# Patient Record
Sex: FEMALE | Race: WHITE
[De-identification: names, ages, dates, MRNs, and addresses within clinical notes are randomized per-mention and may not be internally consistent; named-entity substitution may affect disease eponyms.]

---

## 2019-09-01 NOTE — CT
PRELIMINARY REPORT/[facility]/EMERGENCY AFTER

HOURS PROCEDURE: 

 

EXAM:

CT Lumbar Spine Without Contrast

 

EXAM DATE/TIME:

9/1/2019 1:45 AM

 

CLINICAL HISTORY:

59 years old, female; Low back pain; Patient HX: 60 y/o F presents to ED via EMS transport C/O sudden
 onset lower back pain. PT states that pain began suddenly while she was walking home from [address].
 Denies known trauma, recent fall. Per EMS, vss en route.

 

TECHNIQUE:

Imaging protocol: Computed tomography images of the lumbar spine without contrast.

 

COMPARISON:

No relevant prior studies available.

 

FINDINGS:

Vertebrae: No fracture.

Discs/Spinal canal/Neural foramina: No spinal stenosis. No neural foraminal narrowing.

Kidneys and ureters: Nonobstructive nephrolithiasis left kidney.

Soft tissues: Unremarkable.

 

IMPRESSION:

No fracture.

 

Thank you for allowing us to participate in the care of your patient.

Dictated and Authenticated by: Bean Dee MD

09/01/2019 1:56 AM Central Time ([place] & [place])

 

 

FINAL REPORT 

 

EMERGENCY AFTER HOURS LUMBAR SPINE CT SCAN WITHOUT IV CONTRAST:

 

Date:  09/01/19

Time:  0146 hours

 

HISTORY:  

Low back pain. 

 

FINDINGS/IMPRESSION: 

Bilateral nonobstructing renal calculi without evidence for acute  obstruction. Generalized lumbar 
spondylosis. No fracture or dislocation. 

 

Report in agreement with preliminary report given on-call by Hans. 

 

 

POS: [facility]

## 2019-09-01 NOTE — CT
PRELIMINARY REPORT/[facility]/EMERGENCY AFTER

HOURS PROCEDURE: 

 

EXAM:

CT Head Without Contrast

 

EXAM DATE/TIME:

9/1/2019 1:43 AM

 

CLINICAL HISTORY:

59 years old, female; Altered mental status/memory loss; Patient HX: 60 y/o F presents to ED via EMS 
transport C/O sudden onset lower back pain. PT states that pain began suddenly while she was walking 
home from [facility]. Denies known trauma, recent fall. Per EMS, vss en route.

 

TECHNIQUE:

Imaging protocol: Computed tomography of the head without contrast.

 

COMPARISON:

No relevant prior studies available.

 

FINDINGS:

Brain: Volume loss and chronic small vessel ischemic change. Old infarction in the left basal ganglia
.

No brain edema. No intracranial hemorrhage.

Ventricles: Normal. No ventriculomegaly.

Bones/joints: Unremarkable. No acute fracture.

Sinuses: Visualized sinuses are unremarkable. No fluid levels.

Mastoid air cells: Visualized mastoid air cells are well aerated.

Soft tissues: Unremarkable.

 

IMPRESSION:

No acute brain findings.

 

 

 

Thank you for allowing us to participate in the care of your patient.

Dictated and Authenticated by: Bean Dee MD

09/01/2019 1:52 AM Central Time ([place] & [place])

 

 

 

FINAL REPORT 

 

EMERGENCY AFTER HOURS BRAIN CT WITHOUT IV CONTRAST:

 

Date:  09/01/19 

Time:  0144 hours

 

COMPARISON:  

07/17/14. 

 

FINDINGS/IMPRESSION:

Old left basal ganglia infarct changes. No mass or bleed. 

 

Report in agreement with preliminary report given on-call by Hans. 

 

 

POS: [facility]

## 2019-10-09 NOTE — RAD
PA AND LATERAL CHEST:

 

Date:  10/09/19 

 

HISTORY:  

Cough. 

 

FINDINGS:

The heart size appears slightly enlarged. Mediastinal structures are unremarkable. Lungs are clear of
 infiltrates. There are arthritic changes of the spine. 

 

IMPRESSION: 

Minimal cardiomegaly. No acute findings.  

 

 

 

POS: [facility]

## 2019-11-17 NOTE — CT
CT CERVICAL SPINE WITHOUT CONTRAST:

 

Date:  11/17/19 

 

HISTORY:  

Fall. Trauma. Pain. 

 

FINDINGS:

No craniocervical dissociation. Appropriate alignment of the lateral masses of C1 and C2, as well as 
the facets. Intact odontoid process. 

 

Cervical spine vertebral body height is maintained. There is no fracture. 

 

Soft tissue neck structures are unremarkable. Mass effect upon the posterior left supraglottic larynx
 due to medial deviation of the carotid artery. Upper mediastinum and lung apices are unremarkable. 

 

Varying degrees of central canal stenosis and foraminal narrowing on the basis of degenerative change
. 

 

Cervical spine vertebral body height is maintained. There is no fracture. 

 

IMPRESSION: 

No fracture. 

 

 

POS: [facility]

## 2019-11-17 NOTE — RAD
XR Hand Lt 3 View STANDARD



HISTORY: Injury, left hand pain



FINDINGS:

No fracture or dislocation is identified.



Reported By: Ken Escudero 

Electronically Signed:  11/17/2019 7:18 PM

## 2019-11-17 NOTE — CT
HEAD CT WITHOUT CONTRAST:

 

Date:  11/17/19 

 

COMPARISON:  

09/01/19. 

 

HISTORY:  

Pain. Fall. 

 

FINDINGS:

Left frontal and left periorbital swelling and hematoma. Bilateral ocular lenses are appropriately lo
cated. Both globes are intact. Retrobulbar fat is preserved. Symmetric attenuation of the optic nerve
s and ocular rectus muscles. 

 

Adequate aeration of the sinuses and mastoid air cells. No evidence of an orbital wall or a calvarial
 fracture. 

 

No parenchymal hemorrhage or extra-axial hematoma. No midline shift. 

Basilar cisterns are patent. Age-appropriate atrophy. 

 

Remote insult involving the left external capsule and left corona radiata. Additional white matter hy
podensities due to chronic small vessel ischemic change. Remote white matter insult involving the ant
erior right corona radiata. 

 

IMPRESSION: 

1.  Left periorbital and scalp hematoma. 

2.  No intracranial post-traumatic sequelae. 

3.  Chronic changes as described above. 

 

 

 

POS: [facility]

## 2019-11-17 NOTE — RAD
XR Knee Lt 4 View STANDARD



HISTORY: Injury, left knee pain



FINDINGS:

No fracture or dislocation is identified. Degenerative changes are present.



Reported By: Ken Escudero 

Electronically Signed:  11/17/2019 7:17 PM

## 2020-01-09 NOTE — MRI
Exam: Brain MRI without contrast



HISTORY: Aphasia. Left-sided weakness. Evaluate for CVA.



COMPARISON: 7/18/2014



FINDINGS: 

Calvarial marrow signal intensity: Appropriate T1 signal

Gradient echo sequence: Hemosiderin deposition due to remote insult in the right thalamus in the left
 lentiform nucleus

Brain parenchyma: Stable changes from remote insult. No parenchymal mass, mass effect or midline shif
t. Age-appropriate atrophy.

Cortical gray-white matter differentiation: Preserved

Restricted diffusion: Central arterial flow voids are maintained. Absent restricted diffusion

White matter signal intensities: T2, FLAIR white matter hyperintensities due to chronic small vessel 
ischemic changes



Sinuses: Adequate aeration of the paranasal sinuses and mastoid air cells. 





IMPRESSION:



1. Absent restricted diffusion. No acute infarct

2. Remote insult involving the right thalamus and left lentiform exist.

3. Chronic small vessel ischemic changes of the white matter



Reported By: Jenny Jackson 

Electronically Signed:  1/9/2020 4:47 PM

## 2020-01-09 NOTE — PDOC.HHP
Hospitalist HPI





- History of Present Illness


confusion


History of Present Illness: 


 The patient is a 59 year old female who presented with confusion around 10:00 

am. Per significant other, they were on the way to visit some friends. As they 

were leaving with their bags packed, the significant other noticed that the 

patient was unsteady on her feet and was confused. When they met their friends 

the patient couldn't recognize two of her friends and was unable to state where 

she was. Her speech seemed normal however.  He was concerned about a stroke and 

brought her to the emergency room. The patient complains that her left hand is 

numb and her left leg was numb as well. She reports these symptoms started 

today. She denied dizziness, lightheadedness, chest pain, shortness of breath. 

According to significant other, she has had some episodes of confusion in the 

past, but has no history of dementia. 





ED Course: 








Per ER attending, patient had left sided weakness and slurred speech that 

resolved after 30 minutes. EKG showed no acute disease.  CT brain showed no 

acute disease. The patient was admitted for further workup. When she came to 

the floor, nurse noticed that her NIH was 5 and patient was unable to swallow 

water without coughing. Additional neuro workup ordered. 











Hospitalist ROS





- Review of Systems


Constitutional: denies: fever, chills


Eyes: denies: pain, vision change


ENT: denies: ear pain, ear discharge


Respiratory: denies: cough, shortness of breath, pleuritic pain


Cardiovascular: denies: chest pain, palpitations, orthopnea


Gastrointestinal: denies: nausea, vomiting, abdominal pain, diarrhea, 

constipation


Genitourinary: denies: dysuria, frequency, incontinence


Musculoskeletal: denies: neck pain, shoulder pain





Hospitalist History





- Past Medical History


Cardiac: reports: HTN


Endocrine: reports: Diabetes





- Past Surgical History


Other Surgical History: 





Patient does not remember








- Family History


Other Family History: 





Brothers had strokes








- Social History


Smoking Status: Never smoker


Alcohol: reports: None


Drugs: reports: none


Occupation: Used to work as home health aide. Lives with boyfriend, homelesss 

currently





- Exam


General Appearance: NAD, awake alert


General - other findings: has some issues with word finding, slow speech 


Eye: PERRL, anicteric sclera


ENT: normocephalic atraumatic, no oropharyngeal lesions


Neck: supple, symmetric, no JVD, no thyromegaly


Heart: RRR, no murmur, no gallops, no rubs


Respiratory: CTAB, no wheezes, no rales, no ronchi


Gastrointestinal: soft, non-tender, non-distended, normal bowel sounds


Extremities: no cyanosis, no clubbing, no edema


Skin: normal turgor, no lesions, no rashes


Neurological: cranial nerve grossly intact, no focal deficits


Neurological - other findings: 4/5 strength in LUE and LLE.  Reflex hyperactive 

right leg, downgoing toe L


Musculoskeletal: normal tone, normal strength, no muscle wasting


Psychiatric: normal affect, normal behavior


Psychiatric - other findings: Patient doesn't know president, states date is 

February 2002





Hospitalist Results





- Labs


Result Diagrams: 


 01/09/20 11:01





 01/09/20 11:01


Lab results: 


 











WBC  9.4 thou/uL (4.8-10.8)   01/09/20  11:01    


 


Hgb  14.4 g/dL (12.0-16.0)   01/09/20  11:01    


 


Hct  43.2 % (36.0-47.0)   01/09/20  11:01    


 


MCV  87.1 fL (78.0-98.0)   01/09/20  11:01    


 


Plt Count  312 thou/uL (130-400)   01/09/20  11:01    


 


Neutrophils %  70.3 % (42.0-75.0)   01/09/20  11:01    


 


Sodium  139 mmol/L (136-145)   01/09/20  11:01    


 


Potassium  3.8 mmol/L (3.5-5.1)   01/09/20  11:01    


 


Chloride  101 mmol/L ()   01/09/20  11:01    


 


Carbon Dioxide  28 mmol/L (22-29)   01/09/20  11:01    


 


BUN  18 mg/dL (9.8-20.1)   01/09/20  11:01    


 


Creatinine  0.87 mg/dL (0.6-1.1)   01/09/20  11:01    


 


Glucose  129 mg/dL ()  H  01/09/20  11:01    


 


Calcium  9.6 mg/dL (7.8-10.44)   01/09/20  11:01    


 


Total Bilirubin  0.5 mg/dL (0.2-1.2)   01/09/20  11:01    


 


AST  16 U/L (5-34)   01/09/20  11:01    


 


ALT  11 U/L (8-55)   01/09/20  11:01    


 


Alkaline Phosphatase  108 U/L ()   01/09/20  11:01    


 


Creatine Kinase  62 U/L ()   01/09/20  11:01    


 


Troponin I  Less than  0.010 ng/mL (< 0.028)   01/09/20  11:01    


 


Serum Total Protein  7.4 g/dL (6.0-8.3)   01/09/20  11:01    


 


Albumin  4.2 g/dL (3.5-5.0)   01/09/20  11:01    


 


Urine Ketones  Negative mg/dL (Negative)   01/09/20  13:00    


 


Urine Blood  Negative  (Negative)   01/09/20  13:00    


 


Urine Nitrite  Negative  (Negative)   01/09/20  13:00    


 


Ur Leukocyte Esterase  250 Pia/uL (Negative)  A  01/09/20  13:00    


 


Urine RBC  0-3 HPF (0-3)   01/09/20  13:00    


 


Urine WBC  4-6 HPF (0-3)  A  01/09/20  13:00    


 


Ur Squamous Epith Cells  4-6 HPF (0-3)  A  01/09/20  13:00    


 


Urine Bacteria  None Seen HPF (None Seen)   01/09/20  13:00    














- EKG Interpretation


EKG: 





no acute disease 





Hospitalist H&P A/P





- Plan


Plan: 


MRI brain: no acute infarct. Remote insult involving right thalamus and left 

lentiform exist.  Chronic small vessel change of white matter


CTA: no significant stenosis


CT head: no acute disease





This  is a 59 year old female wtih hypertension, diabetes who presented with 

left sided numbness, confusion, dysarthria











#Acute encephalopathy


#Left sided weakness


- MRI brain showing no acute infarct, remote insult on right thalamus?  CTA 

shows no significant stenosis. CT head showed no acute disease 


- continue aspirin 81 mg daily, atorvastatin 40 mg daily, 


- neurology consult


- ECHO


-PT/OT/speech consult 








Hypertension


- . Will keep BP on the higher side for now








TYpe II diabetes


- insulin sliding scale








GERD


- protonix








Depression 


- continue lexapro 














Code status: full code

## 2020-01-09 NOTE — CT
INDICATION: Aphasia



COMPARISON: None



TECHNIQUE: CT angiogram of the head and neck are performed in the axial plane. Three-dimensional refo
rmatted images are submitted for interpretation.





FINDINGS:



CTA OF THE HEAD WITH AND WITHOUT CONTRAST:



POSTCONTRAST CT OF BRAIN:

Pathologic enhancement: No pathologic enhancement the brain.



Postcontrast soft tissue neck CT:

Sinuses: Adequate aeration.

Orbits: Bilateral ocular lenses are appropriately located. Both globes are intact. Retrobulbar fat is
 preserved. Symmetric attenuation the optic nerves and ocular rectus muscles.

Salivary glands:Symmetric attenuation of the subcutaneous mandibular glands. Symmetric fatty replacem
ent of bilateral parotid glands 

Thyroid gland: Left thyroid lobe appears to be surgically absent. Correlate clinically.

Lymph nodes: Nonspecific enlarged right level 2 lymph node measuring 1.0 x 1.2 cm.

Paraspinal muscles: Symmetric attenuation of the sternocleidomastoid muscles. Appropriate attenuation
 of the paraspinal muscles.

Cervical spine:Vertebral body height is maintained. No fracture. No significant central canal stenosi
s or significant neural foraminal narrowing. Limited evaluation by technique. 

Upper mediastinum and lung apices: No acute abnormality.



CTA OF THE NECK WITH CONTRAST:

Aorta: Appropriate enhancement and luminal diameter

Right carotid artery: The right carotid artery origin, common carotid artery, carotid bifurcation and
 internal carotid artery have appropriate enhancement and luminal diameter.

Left carotid: The left carotid artery origin, common carotid artery, carotid bifurcation and internal
 carotid artery have  appropriate  enhancement and luminal diameter.

Subclavian arteries:Patent and symmetric 

Vertebral arteries:Patent cervical vertebral arteries. Dominant left vertebral  artery. 



CTA OF THE BRAIN:

Intracranial internal carotid arteries:Appropriate enhancement and luminal diameter 

Anterior circulation: Appropriate and symmetric enhancement of the A1 and M1 segments. Proximal A2 se
gments and proximal MCA branches have symmetric enhancement.

Intracranial vertebral arteries: Appropriate enhancement and luminal diameter.

Posterior circulation: Bilateral PICA artery origins have appropriate enhancement and luminal diamete
r. Both vertebral arteries supply a normal caliber basilar artery. Bilateral P1 segments have

appropriate enhancement and luminal diameter.



IMPRESSION:

1. No hemodynamically significant stenosis, occlusion or aneurysmal formation.



2. Nonspecific enlarged right level 2 lymph node. Correlate clinically.



Transcribed Date/Time: 1/9/2020 5:57 PM



Reported By: Jenny Jackson 

Electronically Signed:  1/11/2020 9:00 AM

## 2020-01-09 NOTE — CT
HEAD CT WITHOUT CONTRAST:





HISTORY: 

Level 1 stroke. History of mini strokes. Left-sided weakness. Facial droop.



COMPARISON:

11/17/2019.





FINDINGS:

Hemorrhage: No intraparenchymal hemorrhage or extra-axial hematoma.

Brain parenchyma: Stable encephalomalacia involving the left external capsule and anterior corona rad
iata. There is stable ex vacuo dilatation of the frontal horn of the left lateral ventricle.

Cortical gray-white matter differentiation is preserved. No midline shift. Basilar cisterns are paten
t.Chronic small vessel ischemic changes of the white matter are noted and are unchanged.

Ventricular system: Stable configuration of the ventricular system.

Calvarium: Intact.

Sinuses and mastoid air cells: Adequate aeration.



IMPRESSION:

No acute intracranial process.



Results of the study discussed with Dr. Gordillo 1/9/2020 at 11:10 AM.



Code CR





Transcribed Date/Time: 1/9/2020 11:13 AM



Reported By: Jenny Jackson 

Electronically Signed:  1/9/2020 11:19 AM

## 2020-01-10 NOTE — DIS
DATE OF ADMISSION:  01/09/2020



DATE OF DISCHARGE:  01/10/2020



DISCHARGE DIAGNOSIS:  Acute encephalopathy and left-sided weakness, possibly

secondary to transient ischemic attack versus panic attack. 



CONSULTATIONS:  Neurology with Dr. Luis Alberto Hawley.



PROCEDURES:  None.



BRIEF HISTORY OF PRESENT ILLNESS:  This is a 59-year-old female with past 
medical

history of unspecified mental illness, depression, diabetes, hypertension, who

presented to the emergency room with confusion around 10:00 am.  The patient's

 states they were evicted from their apartment and as they were leaving 
with

their bags packed to their friend's house, the patient was very unsteady on her 
feet

and was confused.  She was unable to recognize two of her friends and was having

difficulty with word finding.  The patient also had complained that her left 
hand

and her left leg were numb.  The patient was brought to the emergency room for

further evaluation.  Upon presentation to the ER, the patient had a blood 
pressure

of 119/69.  The rest of her vitals were unremarkable.  CT head showed no acute

disease.  EKG showed no acute disease.  The patient was noted to have left-sided

weakness and slurred speech per the ER attending on exam, which had resolved 
after

30 minutes.  The patient was admitted for possible TIA. 



HOSPITAL COURSE: 

 Acute encephalopathy and left-sided weakness, possibly secondary

to TIA versus panic attack:  On upon arrival to the floor, the patient had an 
NIH

stroke scale of 5.  She was noted to have weakness on the left arm and the left 
leg

that was 4/5 compared to the right side.  She underwent a CTA of her head and 
neck,

which showed no significant stenosis.  MRI of her brain showed a remote insult 
in

her right thalamus and left lentiform.  There was no acute infarction.  The 
patient

was continued on her aspirin and her statin.  Neurology was consulted, who felt 
that

this was more likely anxiety given her current social situation.  The following 
day,

the patient had no neurological deficits.  She was seen by Physical Therapy who 
did

not feel that she needed rehab.  She was also seen by Speech and was placed on

regular diet.  The patient also had an echocardiogram done which showed no 
thrombus.  She is advised to continue all her home medications as well as 
aspirin

and follow up with her PCP in a week. 



Hypertension:  Initially, the patient's blood pressure medications were held.  
On

the day of discharge, her blood pressure increased to 165/105.  She was resumed 
on

her amlodipine, hydrochlorothiazide, and lisinopril. 



Type 2 diabetes:  The patient takes metformin and glyburide at home. 



Depression:  The patient states that she ran out of her Lexapro a few days 
prior to

admission.  She was given a new prescription for this.  She was also continued 
on

her Abilify, which she takes for unspecified mental illness. 



DISCHARGE PHYSICAL EXAMINATION:  

VITAL SIGNS:  Temperature 98, heart rate 95,

respiratory rate 12, O2 saturation 97% on room air, blood pressure 165 systolic 
over

105.  The patient was given her home antihypertensive. 

GENERAL:  The patient is alert, awake, oriented x3.  She has some trouble with 
word

finding and answering questions, but this is noted to be intermittent and 
chronic

per family members. 

NEURO:  Cranial nerves 2 through 12 were intact.  She has intact sensation in 
all 4

extremities.  She has full range of motion in all 4 extremities and 5/5 
strength in

all 4 extremities.  Reflexes are 2+ throughout.  She has negative Babinski 
sign. 

CVS:  Regular rate and rhythm with no murmurs, rubs, or gallops. 

LUNGS:  Clear to auscultation bilaterally. 

ABDOMEN:  Positive bowel sounds, soft, nontender, nondistended. 

EXTREMITIES:  No edema.



PERTINENT LABORATORY DATA:  

CBC on 01/10: was unremarkable. 

BMP on 01/10: is unremarkable except for elevated glucose. 

LFTs; AST 16, ALT 11, alkaline phosphatase 108. 

Troponin I: less than 0.010 x3. 

UA: shows 4-6 white blood cells, 250 leukocyte esterase.



PERTINENT IMAGING DATA: 

 CT brain on 01/09:shows no acute disease. 

MRI brain on 01/09: shows absent restricted diffusion.  No acute infarct.  
Remote

insult involving the right thalamus and left lentiform.  Chronic small-vessel

ischemic changes of the white matter. 

CTA head and neck on 01/09:shows no hemodynamically significant stenosis or

aneurysmal formation. 

ECHO :  EF 55-60%, grade 1/3 diastolic dysfunction. Normal RV size and 
function. 



DISCHARGE CONDITION:  Stable.



ACTIVITY:  As tolerated.



DIET:  Heart healthy, diabetic diet.



DISCHARGE MEDICATIONS: 

 New medications:  Aspirin 81 mg p.o. daily. 

Old medications:

1. Albuterol inhaler 2 puffs inhalation q.6 hours.

2. Amlodipine 10 mg p.o. daily.

3. Gabapentin 300 mg p.o. daily.

4. Hydrochlorothiazide 12.5 mg p.o. daily.

5. Lisinopril 40 mg p.o. daily.

6. Metformin 500 mg p.o. b.i.d.

7. Metoprolol 25 mg p.o. daily.

8. Oxybutynin 5 mg p.o. b.i.d.

9. Lexapro 20 mg p.o. daily.

10. Hydroxyzine 10 mg p.o. q.8 hours.



DISCHARGE INSTRUCTIONS:  The patient to follow up with her PCP in a week and

consider following up with mental health. 







Job ID:  [id number]



[facility]

## 2020-01-10 NOTE — CON
DATE OF CONSULTATION:  01/09/2020



CONSULTING PHYSICIAN:  Hospitalist Service.



IMPRESSION:  

1. Panic attack.

2. Hyperlipidemia.

3. MRI suggests small vessel ischemic disease.



PLAN:  

1. Start aspirin 81 mg daily.

2. Continue statin.

3. Consider social service consult to see, if assistance can be had to assist in her

living situation. 



HISTORY OF PRESENT ILLNESS:  Ms. Curran is a 59-year-old woman with past history of

hyperlipidemia, asthma, who has been living with her boyfriend in one of the local

motels.  They report that they were essentially out of money and decided they need

to get on the bus and leave at that point.  They actually had no particular

destination.  Her boyfriend noted that she seemed a bit flustered and seemed to have

all difficulty recalling what the game plan would be.  While on the bus, she seemed

to become unable to speak.  They did not note any facial asymmetry or weakness in

the extremities.  He decided that she needed to be checked out medically.  She

continued to act in this fashion, being speechless for about an hour.  Since then,

she continues to appear confused and was reporting having difficulty with

orientation when I assessed earlier.  She denies a history of stroke-like symptoms. 



Since admission, she had a CT of the brain, which was normal.  Her lab work was all

in normal range.  Her CT angiogram did not show any evidence for stenosis.  Her MRI

of the brain showed some old lacunar areas of infarction, but no acute

abnormalities. 



PAST MEDICAL HISTORY:  As listed above.



ALLERGIES:  CODEINE.



SOCIAL HISTORY:  No tobacco or drug use.



FAMILY HISTORY:  Noncontributory.



REVIEW OF SYSTEMS:  Ten-system review of systems is otherwise negative.



PHYSICAL EXAMINATION:

GENERAL:  She is a somewhat overweight middle-aged woman, in no acute distress. 

VITAL SIGNS:  Stable.  She has been afebrile. HEENT:  Pupils equal and reactive.

Conjunctivae clear.  Oropharynx clear. 

NECK:  Supple. 

EXTREMITIES:  No cyanosis or edema. 

NEUROLOGIC:  She was awake and cooperative.  She appears a bit anxious and jittery.

Her speech was fluent and clear.  She was slow to answer questions about her

orientation, but did so correctly for me, but had recently been prompted by the

speech therapist.  There was no cranial nerve deficits.  Motor exam showed good

strength bilaterally.  Sensations intact.  No abnormal movements were seen.  Gait

was not tested. 



SUMMARY:  A workup thus far has been negative.  The overall story suggests a panic

attack.  I do not think that there is going to be any acute neurologic issue to deal

with. 







Job ID:  [id number]

## 2020-01-18 NOTE — EKG
Test Reason : STROKE

Blood Pressure : ***/*** mmHG

Vent. Rate : 062 BPM     Atrial Rate : 062 BPM

   P-R Int : 218 ms          QRS Dur : 068 ms

    QT Int : 374 ms       P-R-T Axes : 043 -28 069 degrees

   QTc Int : 379 ms

 

Sinus rhythm with 1st degree A-V block

Voltage criteria for left ventricular hypertrophy

Inferior infarct , age undetermined

Abnormal ECG

 

Confirmed by JACOB NIXON, MASTER WYNN (9),  NATASHA ALVARENGA ([id number]) on 1/18/2020 10:48:03 AM

 

Referred By:             Confirmed By:MASTER JAMES MD

## 2020-03-04 NOTE — PDOC.FPRHP
- History of Present Illness


Chief Complaint: chest pain


History of Present Illness: 


This is a 59yo F presenting to the ER today for a CC of chest pain that started 

last night. She describes the pain as pressure-like and radiates to her left 

arm and down. She reports the pain was 9/10 at its worst. She states that it 

woke her up throughout the night. The pain was intermittent but became constant 

this AM. She has never had a pain like this before. SHe denies any associated 

symptoms such as SOB, NVD, abd pain, palpitations, vision changes. SHe is 

normally very active and walks around her neighborhood with her  almost 

daily. She states the only thing that has relieved her pain has been the nitro. 





She does have a PMH of stroke in 2017 - she does not have residual weakness. 

She is on clopidogrel and ASA. Patient also has a hx of DM and HTN. She has not 

been compliant with taking her medications. 


ED Course: 


nitro





- Allergies/Adverse Reactions


 Allergies











Allergy/AdvReac Type Severity Reaction Status Date / Time


 


codeine Allergy  Nausea Verified 03/04/20 17:21














- Home Medications


 











 Medication  Instructions  Recorded  Confirmed  Type


 


Amlodipine Besylate [amLODIPine 10 mg PO DAILY 07/18/14 03/04/20 History





Besylate]    


 


Gabapentin 300 mg PO BID 07/18/14 03/04/20 History


 


Lisinopril 40 mg PO DAILY 07/18/14 03/04/20 History


 


Metoprolol Tartrate 25 mg PO DAILY 07/18/14 03/04/20 History


 


Oxybutynin Chloride 5 mg PO BID 07/18/14 03/04/20 History


 


hydrOXYzine HCl 10 mg PO Q8HR PRN 07/18/14 03/04/20 History


 


metFORMIN [Glucophage] 500 mg PO BID 07/18/14 03/04/20 History


 


Hydrochlorothiazide 12.5 mg PO DAILY 01/09/20 03/04/20 History


 


Aspirin [Ecotrin Low Strength] 81 mg PO DAILY #30 tab 01/10/20 03/04/20 Rx


 


Atorvastatin Calcium [Lipitor] 40 mg PO DAILY #30 tab 01/10/20 03/04/20 Rx


 


Escitalopram Oxalate [Lexapro] 20 mg PO DAILY #30 tab 01/10/20 03/04/20 Rx


 


Clopidogrel Bisulfate [Clopidogrel] 75 g PO DAILY 03/04/20 03/04/20 History


 


FLUoxetine HCl [Prozac] 40 mg PO DAILY 03/04/20 03/04/20 History


 


traZODone HCl [Trazodone HCl] 25 mg PO HS 03/04/20 03/04/20 History














- History


PMHx: DM, HTN, HLD, hx of stroke, anxiety/depression, biplar


 


PSHx: partial hysterectomy, cholecystectomy, tonsillectomy, appendectomy





FHx: Father - [place] age 64


 


Social: Denies alcohol, tobacco or drug use


 








- Review of Systems


General: denies: fever/chills, weight/appetite/sleep changes, night sweats, 

fatigue


Eyes: denies: vision changes


ENT: denies: nasal congestion, rhinorrhea


Respiratory: denies: cough, congestion, shortness of breath, exercise 

intolerance


Cardiovascular: reports: chest pain.  denies: palpitation, edema, paroxysmal 

nocturnal dyspnea, orthopnea


Gastrointestinal: denies: nausea, vomiting, diarrhea, constipation, abdominal 

pain


Genitourinary: denies: dysuria


Skin: denies: rashes, lesions


Musculoskeletal: denies: pain, tenderness


Neurological: denies: weakness





- Vital signs


BP: 113/56, Pulse: 61, Temp: 98.1 (Oral), Pain: 6, O2 sat: 95 on (Room Air), 

Time: 3/4/2020 14:08.


BP: 120/62, MAP: 81, Pulse: 57, Resp: 14, Temp: 98.2 (Oral), Pain: 4, O2 sat: 

96 on (Room Air), Time: 3/4/2020 15:31. Weight 82kg 








- Physical Exam


Constitutional: NAD, awake, alert and oriented, well developed


HEENT: normocephalic and atraumatic, PERRLA, EOMI, grossly normal vision, 

grossly normal hearing, MMM


Neck: supple, FROM, trachea midline


Chest: no lesions


-Chest: 


TTP across ant chest


Heart: RRR, normal S1/S2, no murmurs/rubs/gallops, pulses present, no edema


Lungs: CTAB, no respiratory distress, good air movement, no rales/rhonchi, no 

wheezing


Abdomen: soft, non-tender, bowel sounds present, no masses/distention


Musculoskeletal: normal structure


Neurological: no focal deficit


Skin: no rash/lesions, good turgor, capillary refill <2 seconds


Heme/Lymphatic: no unusual bruising or bleeding


Psychiatric: normal mood and affect, good judgment and insight, intact recent 

and remote memory





FMR H&P: Results





- Labs


Result Diagrams: 


 03/05/20 04:38





 03/05/20 04:38


Lab results: 


 











WBC  8.7 thou/uL (4.8-10.8)   03/04/20  14:28    


 


Hgb  12.3 g/dL (12.0-16.0)   03/04/20  14:28    


 


Hct  35.9 % (36.0-47.0)  L  03/04/20  14:28    


 


MCV  89.8 fL (78.0-98.0)   03/04/20  14:28    


 


Plt Count  251 thou/uL (130-400)   03/04/20  14:28    


 


Neutrophils %  62.7 % (42.0-75.0)   03/04/20  14:28    


 


Sodium  140 mmol/L (136-145)   03/04/20  14:28    


 


Potassium  3.7 mmol/L (3.5-5.1)   03/04/20  14:28    


 


Chloride  105 mmol/L ()   03/04/20  14:28    


 


Carbon Dioxide  27 mmol/L (23-31)   03/04/20  14:28    


 


BUN  16 mg/dL (9.8-20.1)   03/04/20  14:28    


 


Creatinine  0.82 mg/dL (0.6-1.1)   03/04/20  14:28    


 


Glucose  123 mg/dL ()  H  03/04/20  14:28    


 


Calcium  9.2 mg/dL (7.8-10.44)   03/04/20  14:28    


 


Total Bilirubin  0.4 mg/dL (0.2-1.2)   03/04/20  14:28    


 


AST  17 U/L (5-34)   03/04/20  14:28    


 


ALT  11 U/L (8-55)   03/04/20  14:28    


 


Alkaline Phosphatase  95 U/L ()   03/04/20  14:28    


 


Creatine Kinase  51 U/L ()   03/04/20  14:28    


 


Serum Total Protein  6.2 g/dL (6.0-8.3)   03/04/20  14:28    


 


Albumin  3.7 g/dL (3.4-4.8)   03/04/20  14:28    


 


Lipase  25 U/L (8-78)   03/04/20  14:28    














- Radiology Interpretation


  ** Chest x-ray


Status: report reviewed by me (stable from previous study)





FMR H&P: A/P





- Problem List


(1) HTN (hypertension)


Current Visit: Yes   Status: Chronic   Code(s): I10 - ESSENTIAL (PRIMARY) 

HYPERTENSION   





(2) Chest pain


Current Visit: Yes   Status: Acute   Code(s): R07.9 - CHEST PAIN, UNSPECIFIED   





(3) Diabetes


Current Visit: No   Status: Chronic   Code(s): E11.9 - TYPE 2 DIABETES MELLITUS 

WITHOUT COMPLICATIONS   





- Plan


Typical Chest pain, ACS r/o


Patient with pressure-like chest pain since last night, substernal radiating, 

and relieved with nitro. CXR nml. EKG non specific. HEART SCORE: 5


- Trops neg, will trend


- Risk stratify with FLP, TSH, A1c


- Stress in AM - will hold BB


- Nitro PRN


- admit to tele obs





HTN


- aware, will monitor. Restart home meds





DM


- aware, ACHS, mild SS





HLD


- on statin, f/u FLP





Psych problems


- Will continue meds





Code: FULL


Diet: HH, NPO at midnight


DVT: SCDs (low padua score)


PCP: ASHISH Lee


Dispo: admit to tele obs for ACS r/o





Case discussed with Dr. Eduardo Ceja - Attending





- Attending Attestation


Date/Time: 03/05/20 4316





I personally evaluated the patient and discussed the management with Dr. Bond 

yesterday.


I agree with the History, Examination, Assessment and Plan documented above 

with any addition or exceptions noted below.

## 2020-03-05 NOTE — PRG
DATE OF SERVICE:  03/05/2020



ADDENDUM:  Please add this as an addendum to the note of Dr. Merary Johnson.  Ms. Curran had been admitted with chest pain.  She just returned from her pharmaceutical

stress test and results are still pending.  She has been mostly chest pain free

since being admitted.  Her troponin levels are never elevated and remained less than

0.010.  Her chemistry shows sodium of 140, potassium 3.7, chloride 105, bicarb 27,

BUN 16, and creatinine 0.82. 



In discussing more history with Ms. Curran, it is evident she was diagnosed with

sleep apnea several years ago.  She, however, could not tolerate the CPAP machine

and quit using it.  We have emphasized to her the importance of treating sleep

apnea.  She will follow up with Dr. Lomeli in her clinic and arrange to have another

sleep study and proceed from there.  The rest of the workup will depend on results

of her stress test. 







Job ID:  [id number]

## 2020-03-05 NOTE — NM
Radionucleotide stress only myocardial perfusion scan with CT attenuation correction and SPECT imagin
g

Left ventricular wall motion evaluation and ejection fraction



HISTORY: Chest pain.



FINDINGS: Lexiscan protocol. Physiologic uptake of radiotracer throughout the left ventricular myocar
dium. No focal perfusion defect evident.



QGS analysis of gated SPECT images shows diminished motion of the septum. Ejection fraction calculate
d at 56%.











IMPRESSION: No scintigraphic evidence of ischemia.



Normal LVEF.



Reported By: FADUMO Rosado 

Electronically Signed:  3/5/2020 11:57 AM

## 2020-03-05 NOTE — PDOC.FM
- Subjective


Subjective: 





Patient doing okay this morning. Reports intermittent left-sided sharp chest 

pain, somewhat reproducible with palpation. Discussed plans for stress test 

this morning, patient agreeable. 





- Objective


Vital Signs & Weight: 


 Vital Signs (12 hours)











  Temp Pulse Resp BP BP Pulse Ox


 


 03/05/20 03:02   52 L  18  156/76 H  


 


 03/04/20 19:55  98.3 F  64  16   128/66  97








 Weight











Weight                         86.664 kg














I&O: 


 











 03/03/20 03/04/20 03/05/20





 06:59 06:59 06:59


 


Intake Total   1720


 


Balance   1720











Result Diagrams: 


 03/05/20 04:38





 03/05/20 04:38


EKG Reviewed by me: Yes (sinus mikel, 2nd degree type 1 at times)





Phys Exam





- Physical Examination


Constitutional: NAD


HEENT: moist MMs, sclera anicteric


Neck: supple, full ROM


Respiratory: no wheezing, clear to auscultation bilateral


Cardiovascular: RRR, no significant murmur


Gastrointestinal: soft, non-tender


Musculoskeletal: no edema, pulses present


Neurological: non-focal, moves all 4 limbs


Lymphatic: no nodes


Psychiatric: normal affect, A&O x 3


Skin: no rash, normal turgor





Dx/Plan


(1) Chest pain


Code(s): R07.9 - CHEST PAIN, UNSPECIFIED   Status: Acute   





(2) HTN (hypertension)


Code(s): I10 - ESSENTIAL (PRIMARY) HYPERTENSION   Status: Chronic   





(3) Diabetes


Code(s): E11.9 - TYPE 2 DIABETES MELLITUS WITHOUT COMPLICATIONS   Status: 

Chronic   





(4) HLD (hyperlipidemia)


Code(s): E78.5 - HYPERLIPIDEMIA, UNSPECIFIED   Status: Acute   





(5) History of CVA (cerebrovascular accident)


Code(s): Z86.73 - PRSNL HX OF TIA (TIA), AND CEREB INFRC W/O RESID DEFICITS   

Status: Acute   





- Plan


Plan: 


Patient is a 60F with PMHx of HTN, DM, HLD, prior CVA that presents with 

typical chest pain





#Typical Chest pain, ACS r/o


Patient with pressure-like chest pain since last night, substernal radiating, 

and relieved with nitro. CXR nml. EKG non specific. HEART SCORE: 5


- Trops neg x3


- Risk stratify:


   -triglycerides 161


   -total chol 182


   -


   -HDL 41


   -A1C 5.9


   -TSH 1.23


- Stress test today, hold BB


- Nitro PRN


- continue to monitor on telemetry





#HTN


- aware, will monitor. Restart home meds





#DM


- A1C 5.9


- ACHS, mild SS





#HLD


- on statin





#Psych problems


- Will continue meds





#Hx of CVA


-continue home asa and clopidogrel





Code: FULL


Diet: NPO for stress test


DVT: SCDs (low padua score)


PCP: ASHISH Lee


Dispo: admitted to tele obs for ACS r/o; plan for stress test today

## 2020-03-06 NOTE — DIS
DATE OF ADMISSION:  03/04/2020



DATE OF DISCHARGE:  03/05/2020



ADMITTING RESIDENT:  Mackenzie Bond MD



ADMITTING ATTENDING:  Mat Clark MD



DISCHARGE RESIDENT:  Merary Johnson MD



DISCHARGE ATTENDING:  Benson Richard MD.



CONSULTS:  Walking programs.



PROCEDURES:  Stress test, nuclear medicine:  No focal perfusion defect evident.

Ejection fraction calculated at 56%. 



IMAGING:  Chest x-ray:  No significant acute intrathoracic disease.  
Atherosclerosis

of aorta.  Stable from prior study. 



PRIMARY DIAGNOSIS:  Typical chest pain.



SECONDARY DIAGNOSES:  

1. Hypertension.

2. Diabetes.

3. Hyperlipidemia.

4. Psychiatric issues.

5. History of cerebrovascular accident.



DISCHARGE MEDICATIONS:  

1. Amlodipine 10 mg p.o. daily.

2. Aspirin 81 mg p.o. daily.

3. Atorvastatin 40 mg p.o. daily.

4. Clopidogrel 75 mg p.o. daily.

5. Lexapro 20 mg p.o. daily.

6. Prozac 40 mg p.o. daily.

7. Gabapentin 300 mg p.o. b.i.d.

8. Hydrochlorothiazide 12.5 mg p.o. daily.

9. Hydroxyzine 10 mg p.o. q.8 hours p.r.n.

10. Lisinopril 40 mg p.o. daily.

11. Metformin 500 mg p.o. b.i.d.

12. Metoprolol tartrate 25 mg p.o. daily.

13. Oxybutynin 5 mg p.o. b.i.d.

14. Trazodone 25 mg p.o. at bedtime.



DISCONTINUED MEDICATIONS:  

1. Insulin sliding scale.

2. Nitroglycerin transdermal n.p.o.

3. Zofran p.r.n.



HISTORY OF PRESENT ILLNESS/HOSPITAL COURSE:  The patient is a 60-year-old female

with a past medical history of hyperlipidemia, hypertension, diabetes, and 
history

of CVA, who presented to the ED with chest pain that started the night before.  
She

reported the pain to be pressure-like that radiated up her left arm and the 
pain was

rated to be 9/10 at its worst.  She reports the pain has woken her up 
throughout the

night.  She had never had pain like this before, but denied any other associated

symptoms.  The patient was admitted to the hospital for a cardiac workup.  The

patient was found to have troponin negative x3, triglycerides of 161, total

cholesterol 182, LDL cholesterol 109, HDL cholesterol 41, A1c 5.9, TSH 1.23.  
She

had a stress test, see above. 



The patient was evaluated during the hospitalization and with some palpation, 
there

was some reproducible chest pain.  Her stress test was negative and it was 
discussed

with the patient that her pain is likely musculoskeletal. She was evaluated on 
the

day of discharge and found to be in stable condition.  She was encouraged to

continue a healthy diet and to follow up with her primary care physician. 



The patient was also encouraged during this hospitalization to f/u sleep study 
for ZELDA.



DISPOSITION:  Stable.



DISCHARGE INSTRUCTIONS:  

1. Location:  Home.

2. Diet:  Heart healthy, diabetic diet, no added salt diet.

3. Activity:  As tolerated.

4. Followup:  With Dr. Sharla Lomeli within 7 days.







Job ID:  [id number]



MTDD

## 2020-03-07 NOTE — EKG
Test Reason : 

Blood Pressure : ***/*** mmHG

Vent. Rate : 058 BPM     Atrial Rate : 058 BPM

   P-R Int : 274 ms          QRS Dur : 086 ms

    QT Int : 398 ms       P-R-T Axes : 028 -30 087 degrees

   QTc Int : 390 ms

 

Sinus bradycardia with 1st degree A-V block

Left axis deviation

Voltage criteria for left ventricular hypertrophy

T wave abnormality, consider lateral ischemia

Abnormal ECG

 

Confirmed by MARIE NIXON, ARGENTINA ([id number]),  NATASHA ALVARENGA ([id number]) on 3/7/2020 3:49:14 PM

 

Referred By:             Confirmed By:ARGENTINA SALAZAR MD

## 2020-04-26 NOTE — RAD
Exam: Chest one view



HISTORY:Cough



Comparison: 3/4/2020



FINDINGS:

Cardiac silhouette: Normal

Aorta: Unremarkable

Pulmonary vessels: Normal

Costophrenic angles: Clear



LUNGS: No masses or consolidation.



Pneumothorax: None



Osseous abnormalities: None



IMPRESSION: No acute cardiopulmonary process.



Reported By: Jenny Jackson 

Electronically Signed:  4/26/2020 10:43 PM

## 2020-04-29 NOTE — EKG
Test Reason : CP

Blood Pressure : ***/*** mmHG

Vent. Rate : 070 BPM     Atrial Rate : 070 BPM

   P-R Int : 260 ms          QRS Dur : 088 ms

    QT Int : 394 ms       P-R-T Axes : 038 -23 046 degrees

   QTc Int : 425 ms

 

Sinus rhythm with 1st degree A-V block

Minimal voltage criteria for LVH, may be normal variant

Nonspecific T wave abnormality

Abnormal ECG

 

Confirmed by IRIS CARTER ([id number]),  IZAIAH BRAGA ([id number]) on 4/29/2020 1:25:37 PM

 

Referred By:             Confirmed By:IRIS CARTER

## 2020-06-14 NOTE — RAD
Exam: Chest one view



HISTORY:Dyspnea.



Comparison: 4/26/2020



FINDINGS:

Cardiac silhouette: Normal

Aorta: Unremarkable

Pulmonary vessels: Normal

Costophrenic angles: Clear



LUNGS: No masses or consolidation.



Pneumothorax: None



Osseous abnormalities: None



IMPRESSION: No acute cardiopulmonary process.



Reported By: Jenny Jackson 

Electronically Signed:  6/14/2020 9:22 AM

## 2020-06-14 NOTE — PDOC.FPRHP
- History of Present Illness


Chief Complaint: leg pain, SOB


History of Present Illness: 





59 yo F with hx of CVA x3 presents to ER for SOB and lower extremity swelling 

and pain. Her RLE pain is bothering her the most, she is very anxious and 

difficult to obtain history b/c she is highly tearful and anxious and states 

"she can't remember" when I ask her to expand on her statements. RLE swelling 

and pain started yesterday. Denies trauma,  smoking, no hx of blood clots in 

herself or family. Feels SOB but no chest pain, cough, fever, chills, diarrhea, 

changes in smell/taste. Echo in Jan 2020 with EF 55% and 1/3 diastolic dysfx, 

otherwise normal.  in ER with normal CXR. 





No COV19 or sick contact exposure. No recent travel. 





Per ER PA patient had high BPs in ER with SBP >200. Patient states didn't take 

her BP meds this morning b/c she "didn't feel well." She wasn't able to tell me 

anything more than this without crying. 





In the ER CTA was negative for PE. EKG unremarkable.  with normal CXR as 

well. Non hypoxic on room air. Received meds listed below with some improvement 

in pain but right calf still bothering her.


ED Course: 





1g tylenol, 20mg IV hydralazine, nitro paste, 2 puff atrovent, 4mg  zofran, 4mg 

morphine





- Allergies/Adverse Reactions


 Allergies











Allergy/AdvReac Type Severity Reaction Status Date / Time


 


codeine Allergy Intermediate Nausea Verified 06/14/20 15:39














- Home Medications


 











 Medication  Instructions  Recorded  Confirmed  Type


 


Amlodipine Besylate [amLODIPine 10 mg PO DAILY 07/18/14 06/14/20 History





Besylate]    


 


Gabapentin 300 mg PO BID 07/18/14 06/14/20 History


 


Lisinopril 40 mg PO DAILY 07/18/14 06/14/20 History


 


Metoprolol Tartrate 25 mg PO DAILY 07/18/14 06/14/20 History


 


Oxybutynin Chloride 5 mg PO BID 07/18/14 06/14/20 History


 


hydrOXYzine HCl 10 mg PO Q8HR PRN 07/18/14 06/14/20 History


 


metFORMIN [Glucophage] 500 mg PO BID 07/18/14 06/14/20 History


 


Hydrochlorothiazide 12.5 mg PO DAILY 01/09/20 06/14/20 History


 


Aspirin [Ecotrin Low Strength] 81 mg PO DAILY #30 tab 01/10/20 06/14/20 Rx


 


Atorvastatin Calcium [Lipitor] 40 mg PO DAILY #30 tab 01/10/20 06/14/20 Rx


 


Escitalopram Oxalate [Lexapro] 20 mg PO DAILY #30 tab 01/10/20 06/14/20 Rx


 


Clopidogrel Bisulfate [Clopidogrel] 75 g PO DAILY 03/04/20 06/14/20 History


 


FLUoxetine HCl [Prozac] 40 mg PO DAILY 03/04/20 06/14/20 History


 


traZODone HCl [Trazodone HCl] 25 mg PO HS 03/04/20 06/14/20 History














- History


PMHx: Asthma, anxiety, HTN, panic attacks


 


PSHx:  Hysterectomy, cholecystectomy, diabetic neuropathy, insomnia, right 

meniscal tear





FHx: Denies fam hx of blood clots, unremarkable otherwise


 


Social: Denies history or current use of TAD


 








- Review of Systems


General: denies: fever/chills, weight/appetite/sleep changes


ENT: denies: nasal congestion, rhinorrhea


Respiratory: reports: shortness of breath.  denies: cough, congestion


Cardiovascular: reports: edema.  denies: chest pain, orthopnea


Gastrointestinal: denies: nausea, vomiting, diarrhea, constipation, abdominal 

pain


Skin: reports: rashes.  denies: lesions


Musculoskeletal: reports: pain, tenderness, swelling.  denies: stiffness, 

arthritis/arthralgias


Neurological: denies: weakness


Psychological: reports: anxiety





- Vital signs


BP: 156/92, MAP: 113, Pulse: 66, Resp: 18, Temp: 98.5 (Oral), Pain: 8, O2 sat: 

96 on (Room Air), Time: 6/14/2020 13:02. 








- Physical Exam


Constitutional: awake, alert and oriented, well developed


HEENT: normocephalic and atraumatic, PERRLA, EOMI, conjunctiva clear, no 

scleral icterus


Neck: supple, FROM, trachea midline


Heart: RRR, normal S1/S2, no murmurs/rubs/gallops


Lungs: CTAB, no respiratory distress, good air movement


Abdomen: soft, non-tender, bowel sounds present


Musculoskeletal: normal structure, normal tone, ROM grossly normal


Neurological: no focal deficit, CN II-XII intact


-Heme/Lymphatic: 





right calf swelling, tender to palpation diffusely, no palpable cord


pulses intact, good cap refill in toes, warm to touch but no erythema, able to 

move but difficult b/o pain


-Psychiatric: 





anxious affect





FMR H&P: Results





- Labs


Result Diagrams: 


 06/14/20 09:05





 06/14/20 09:05


Lab results: 


 











WBC  9.1 thou/uL (4.8-10.8)   06/14/20  09:05    


 


Hgb  12.7 g/dL (12.0-16.0)   06/14/20  09:05    


 


Hct  36.4 % (36.0-47.0)   06/14/20  09:05    


 


MCV  89.9 fL (78.0-98.0)   06/14/20  09:05    


 


Plt Count  245 thou/uL (130-400)   06/14/20  09:05    


 


Neutrophils %  65.6 % (42.0-75.0)   06/14/20  09:05    


 


Sodium  138 mmol/L (136-145)   06/14/20  09:05    


 


Potassium  4.0 mmol/L (3.5-5.1)   06/14/20  09:05    


 


Chloride  103 mmol/L ()   06/14/20  09:05    


 


Carbon Dioxide  27 mmol/L (23-31)   06/14/20  09:05    


 


BUN  19 mg/dL (9.8-20.1)   06/14/20  09:05    


 


Creatinine  0.87 mg/dL (0.6-1.1)   06/14/20  09:05    


 


Glucose  239 mg/dL ()  H  06/14/20  09:05    


 


Calcium  8.7 mg/dL (7.8-10.44)   06/14/20  09:05    


 


Total Bilirubin  0.3 mg/dL (0.2-1.2)   06/14/20  09:05    


 


AST  20 U/L (5-34)   06/14/20  09:05    


 


ALT  23 U/L (8-55)   06/14/20  09:05    


 


Alkaline Phosphatase  108 U/L ()   06/14/20  09:05    


 


B-Natriuretic Peptide  143.5 pg/mL (0-100)  H  06/14/20  09:05    


 


Serum Total Protein  6.4 g/dL (6.0-8.3)   06/14/20  09:05    


 


Albumin  3.5 g/dL (3.4-4.8)   06/14/20  09:05    














- Radiology Interpretation


  ** CT scan - chest


Status: report reviewed by me


Additional comment: 





Negative for acute processes including PE





  ** Chest x-ray


Status: image reviewed by me, report reviewed by me


Additional comment: 





no acute cardiopulmonary processes





FMR H&P: A/P





- Problem List


(1) Hypertensive urgency


Current Visit: Yes   Status: Acute   Code(s): I16.0 - HYPERTENSIVE URGENCY   





(2) Calf swelling


Current Visit: Yes   Status: Acute   Code(s): M79.89 - OTHER SPECIFIED SOFT 

TISSUE DISORDERS   





(3) HLD (hyperlipidemia)


Current Visit: No   Status: Acute   Code(s): E78.5 - HYPERLIPIDEMIA, 

UNSPECIFIED   





(4) History of CVA (cerebrovascular accident)


Current Visit: No   Status: Acute   Code(s): Z86.73 - PRSNL HX OF TIA (TIA), 

AND CEREB INFRC W/O RESID DEFICITS   





(5) Acute anterior circulation TIA


Current Visit: No   Status: Chronic   Code(s): G45.8 - OTH TRANSIENT CEREBRAL 

ISCHEMIC ATTACKS AND RELATED SYND   





(6) HTN (hypertension)


Current Visit: No   Status: Chronic   Code(s): I10 - ESSENTIAL (PRIMARY) 

HYPERTENSION   





(7) Anxiety


Current Visit: Yes   Status: Acute   Code(s): F41.9 - ANXIETY DISORDER, 

UNSPECIFIED   





(8) Diabetes


Current Visit: No   Status: Chronic   Code(s): E11.9 - TYPE 2 DIABETES MELLITUS 

WITHOUT COMPLICATIONS   





- Plan








59 yo F admitted for hypertensive urgency and DVT r/o





#Hypertensive urgency


-Continue BP monitoring


-Continue home regimen with IV antihypertensives available





#RLE swelling, DVT r/o


-Obtain dopplers stat


-Ddimer elevated at 0.5


-CTA neg for PE








#cHTN


-home regimen, adjust as needed


-consider w/u for causes of secondary HTN since on 3 antihypertensives -can f/u 

in clinic for this





#Anxiety


-home regimen





#Insomnia


-home regimen





#Hx of CVA


-stable neuro exam


-home meds





#HLD


-home meds











Code: DNR


PCP: ASHISH


Dispo: <2 midnights


Admit: Tele/Obs





Addendum - Attending





- Attending Attestation


Date/Time: 06/14/20 3357





I personally evaluated the patient and discussed the management with Dr. Nguyen. 


I agree with the History, Examination, Assessment and Plan documented above 

with any addition or exceptions noted below.





Patient here with acute complaint of leg pain and swelling. Denies trauma. She 

also has elevated BP. We are admitting to obs for DVT r/o and HTN urgency. 

Obtain BP control and obtain LE dopplers. Further mgmt pending that result.

## 2020-06-14 NOTE — ULT
ULTRASOUND DOPPLER DUPLEX VENOUS RIGHT LOWER EXTREMITY:



DATE:

6/14/2020



HISTORY:

60-year-old female with right lower extremity pain



TECHNIQUE:

Grayscale, color-flow, and spectral analysis, of the right common femoral, profunda femoral, greater 
saphenous, femoral, popliteal, and posterior tibial, veins.



FINDINGS:

Unable to compress common femoral vein and greater saphenous vein appropriately to demonstrate comple
te collapse because of patient pain. Blood flow is demonstrated in these veins by Doppler.



There is complete compressibility, and flow demonstrated by Doppler, in the femoral, profunda femoral
, popliteal, and posterior tibial, veins.



There is a 3.5 x 2 x 1 cm cystic lesion in the popliteal fossa.



IMPRESSION:

1) no occlusive deep venous thrombosis.

2) unable to completely evaluate for nonocclusive thrombosis in the right common femoral and greater 
saphenous veins, because of patient pain and tenderness.

3) Baker's cyst



Reported By: Daniel Osei 

Electronically Signed:  6/14/2020 4:33 PM

## 2020-06-14 NOTE — CT
Exam: CT angiogram of the chest



HISTORY: Hyperlipidemia. Hypertension. Asthma. Acute shortness of breath.



COMPARISON: None





TECHNIQUE: CT angiogram of the chest is performed in the axial plane. Three-dimensional reformatted i
mages are submitted for interpretation



FINDINGS:

Mediastinum: No mass, lymphadenopathy or hematoma.



HEART: Normal size. No significant pericardial fluid. 

Aorta: No aneurysm or dissection 

Upper solid abdominal viscera: No abnormality enhancement. 

Trachea and central bronchi: Patent

Pleural spaces: No effusion



Lung parenchyma: Minimal dependent atelectatic changes. No masses or consolidation.

Pneumothorax: None

Osseous structures: No lytic or blastic lesions



Pulmonary arteries: Adequate contrast opacification pulmonary arterial system to the level of segment
al arteries. No filling defect to suggest pulmonary embolism







IMPRESSION:No evidence of pulmonary artery embolism to the level of segmental arteries.



Reported By: Jenny Jackson 

Electronically Signed:  6/14/2020 10:41 AM

## 2020-06-15 NOTE — PDOC.FM
- Subjective


Subjective: 


No events overnight. Notes very minimal SOB - has approx 35 yr hx of 2nd hand 

smoke. Never smoked herself. No previous dx of COPD. Willing to follow up with 

PCP for this. RLE pain resolved. BP's remained WNL since home rx. No further 

complaints.





- Objective


Vital Signs & Weight: 


 Vital Signs (12 hours)











  Temp Pulse Resp BP BP Pulse Ox


 


 06/15/20 03:06  98.1 F  57 L  18   141/65 H  95


 


 06/14/20 20:23  97.4 F L  56 L  18  136/73  136/73  94 L








 Weight











Weight                         98.566 kg














I&O: 


 











 06/13/20 06/14/20 06/15/20





 06:59 06:59 06:59


 


Intake Total   1180


 


Balance   1180











Result Diagrams: 


 06/15/20 03:21





 06/15/20 03:21





Phys Exam





- Physical Examination


Constitutional: NAD


HEENT: moist MMs


Neck: full ROM


Respiratory: clear to auscultation bilateral


Cardiovascular: RRR, no significant murmur


Gastrointestinal: soft


Musculoskeletal: no edema, pulses present


Neurological: moves all 4 limbs


Psychiatric: normal affect, A&O x 3


Skin: no rash





Dx/Plan





- Plan


Plan: 


Hypertensive urgency - Resolved


-BP's remained elevated to barely Stage II HTN, no severe range pressures since 

admission


-Had not taken Rx yesterday and was very anxious likely elevated BP





RLE swelling/pain, DVT r/o


-Dopplers showing flow through RLE, exam not complete due to pt not tolerating 

compression of common fem and greater saph


-Ddimer elevated at 0.5


-CTA neg for PE





cHTN


-home regimen currently adequate


-controlled on 3 antihypertensives, previous mention of working up secondary 

causes but as now currently controlled when pt compliant with home regimen I do 

not feel this is indicated





Anxiety


-home regimen





Insomnia


-home regimen





Hx of CVA


-stable neuro exam


-home meds





HLD


-home meds





Code: DNR


PCP: ASHISH


Dispo: Obs. HTN and SOB likely related to Rx non compliance and pt's anxiety. 

US RLE did not show a DVT but was technically difficult due to pt not 

tolerating compression. I feel she is stable to be followed as an outpt for 

this in the setting of no obvious DVT and a negative CTA chest. BP's remaining 

adequately controlled on home regimen since admission. Plan for DC later today.





Addendum - Attending





- Attending Attestation


Date/Time: 06/15/20 2120





I personally evaluated the patient and discussed the management with Dr. Abarca


I agree with the History, Examination, Assessment and Plan documented above 

with any addition or exceptions noted below- Patient without complaints. States 

that leg pain and SOB have resolved. Afebrile VSS. A/P: 1) Hypertensive urgency

- BP much improved; continue home meds. 2) Leg pain- doppler with baker's cyst; 

pain now resolved. D/c home today.

## 2020-06-16 NOTE — DIS
DATE OF ADMISSION:  06/14/2020



DATE OF DISCHARGE:  06/15/2020



ADMITTING ATTENDING:  Andres Zavala MD



DISCHARGE ATTENDING:  Jenifer Abreu MD.



RESIDENT:  Mervin Abarca DO.



CONSULTS:  None.



PROCEDURES:  None.



IMAGING DATA:  Chest x-ray, 06/14/2020.  Impression, no acute cardiopulmonary

process. 



Chest CTA, 06/14/2020.  Impression, no evidence of pulmonary artery embolism at the

level of the segmental arteries. 



Right lower extremity vascular ultrasound, 06/14/2020.  Impression, no occlusive

deep vein thrombosis, unable to completely evaluate for nonocclusive thrombosis in

the right common femoral and greater saphenous vein due to patient intolerance with

exam.  Griffin's cyst noted. 



PRIMARY DIAGNOSES:  Right popliteal Baker's cyst, shortness of breath likely

secondary to undiagnosed chronic obstructive pulmonary disease or obesity

hypoventilation syndrome, hypertensive urgency. 



SECONDARY DIAGNOSES,:  Chronic hypertension, anxiety, insomnia, history of CVA.



DISCHARGE MEDICATIONS:  

1. Metformin 500 mg b.i.d.

2. Hydroxyzine 10 mg q.8 hours p.r.n.

3. Oxybutynin 5 mg b.i.d.

4. Metoprolol tartrate 25 mg daily.

5. Lisinopril 40 mg daily.

6. Gabapentin 300 mg b.i.d.

7. Amlodipine 10 mg daily.

8. Hydrochlorothiazide 12.5 mg daily.

9. Ecotrin 81 mg daily.

10. Atorvastatin 40 mg daily.

11. Escitalopram 20 mg daily.

12. Trazodone 25 mg at bedtime.

13. Clopidogrel 75 mg daily.

14. Fluoxetine 40 mg daily.



HISTORY OF PRESENT ILLNESS AND HOSPITAL COURSE:  A 60-year-old female with history

of CVA x3, presents to the ER with complaint of shortness of breath and lower

extremity swelling and pain on the right side.  The patient was very anxious and

tearful on exam.  She stated that her right lower extremity pain was most bothersome

to her.  She stated that it started one day prior to arrival.  She denied any

trauma, history of smoking, history of blood clots in herself or family.  The

patient does have an approximately [id number] of secondhand smoke from her

ex-.  The patient had an echo in January 2020 with an EF of 55%, 1/3

diastolic dysfunction, otherwise normal.  Workup in the ED showed her to have a

borderline elevated D-dimer of 0.5.  CTA of the chest was performed and ruled out

any pulmonary embolism.  She then had an ultrasound Doppler of her right lower

extremity to rule out DVT.  The exam showed no occlusive thrombosis of the right

lower extremity; however, the patient did not fully tolerate the exam to rule out

any nonocclusive clots present.  The following day, the patient was feeling much

better with only minimally residual shortness of breath and a complete resolution in

her right lower extremity pain.  The ultrasound of her right lower extremity

incidentally showed a Baker's cyst in her popliteal area.  The patient has a history

of chronic osteoarthritis in right knee.  Denies any known history of Baker's cyst

being present.  We discussed these findings with the patient as well as the need for

outpatient workup for possible underlying COPD secondary to secondhand smoke as well

as possibly addressing her Baker's cyst if her pain continues to be recurrent in the

future. 



DISCHARGE INSTRUCTIONS:  Location:  Home. 



Diet:  Heart healthy, diabetic. 



Activity:  As tolerated. 



Followup:  PCP, Dr. Sharla Lomeli, within 7 days.







Job ID:  [id number]

## 2020-06-20 NOTE — EKG
Test Reason : 

Blood Pressure : ***/*** mmHG

Vent. Rate : 066 BPM     Atrial Rate : 066 BPM

   P-R Int : 258 ms          QRS Dur : 080 ms

    QT Int : 410 ms       P-R-T Axes : 047 -31 056 degrees

   QTc Int : 429 ms

 

Sinus rhythm with 1st degree A-V block with Premature atrial complexes

Left axis deviation

Moderate voltage criteria for LVH, may be normal variant

Nonspecific T wave abnormality

Abnormal ECG

 

Confirmed by ELEN LUDWIG M.D. ([id number]),  NATASHA ALVARENGA ([id number]) on 6/20/2020 2:25:03 PM

 

Referred By:             Confirmed By:ELEN LUDWIG M.D.

## 2022-05-19 ENCOUNTER — HOSPITAL ENCOUNTER (EMERGENCY)
Dept: HOSPITAL 92 - ERS | Age: 62
LOS: 1 days | Discharge: HOME | End: 2022-05-20
Payer: MEDICARE

## 2022-05-19 DIAGNOSIS — R52: Primary | ICD-10-CM

## 2022-05-19 DIAGNOSIS — E78.5: ICD-10-CM

## 2022-05-19 DIAGNOSIS — E78.00: ICD-10-CM

## 2022-05-19 DIAGNOSIS — Z86.73: ICD-10-CM

## 2022-05-19 DIAGNOSIS — Z79.899: ICD-10-CM

## 2022-05-19 DIAGNOSIS — I10: ICD-10-CM

## 2022-05-19 DIAGNOSIS — E11.9: ICD-10-CM

## 2022-05-19 PROCEDURE — 96374 THER/PROPH/DIAG INJ IV PUSH: CPT

## 2022-05-26 ENCOUNTER — HOSPITAL ENCOUNTER (EMERGENCY)
Dept: HOSPITAL 92 - ERS | Age: 62
Discharge: HOME | End: 2022-05-26
Payer: MEDICARE

## 2022-05-26 DIAGNOSIS — M19.071: Primary | ICD-10-CM

## 2022-05-26 DIAGNOSIS — E78.00: ICD-10-CM

## 2022-05-26 DIAGNOSIS — E78.5: ICD-10-CM

## 2022-05-26 DIAGNOSIS — I10: ICD-10-CM

## 2022-05-26 PROCEDURE — 96372 THER/PROPH/DIAG INJ SC/IM: CPT
